# Patient Record
Sex: FEMALE | Race: WHITE | URBAN - METROPOLITAN AREA
[De-identification: names, ages, dates, MRNs, and addresses within clinical notes are randomized per-mention and may not be internally consistent; named-entity substitution may affect disease eponyms.]

---

## 2019-06-26 ENCOUNTER — NEW PATIENT (OUTPATIENT)
Dept: URBAN - METROPOLITAN AREA CLINIC 10 | Facility: CLINIC | Age: 79
End: 2019-06-26
Payer: MEDICARE

## 2019-06-26 DIAGNOSIS — H04.552 ACQUIRED STENOSIS OF LEFT NASOLACRIMAL DUCT: ICD-10-CM

## 2019-06-26 PROCEDURE — 92004 COMPRE OPH EXAM NEW PT 1/>: CPT | Performed by: OPTOMETRIST

## 2019-06-26 PROCEDURE — 92015 DETERMINE REFRACTIVE STATE: CPT | Performed by: OPTOMETRIST

## 2019-06-26 ASSESSMENT — VISUAL ACUITY
OS: 20/25
OD: 20/25

## 2019-06-26 ASSESSMENT — KERATOMETRY
OD: 44.63
OS: 45.13

## 2019-06-26 ASSESSMENT — INTRAOCULAR PRESSURE
OD: 14
OS: 14

## 2019-08-21 ENCOUNTER — CONSULT (OUTPATIENT)
Dept: URBAN - METROPOLITAN AREA CLINIC 10 | Facility: CLINIC | Age: 79
End: 2019-08-21
Payer: MEDICARE

## 2019-08-21 DIAGNOSIS — H04.332 ACUTE LACRIMAL CANALICULITIS OF LEFT LACRIMAL PASSAGE: Primary | ICD-10-CM

## 2019-08-21 PROCEDURE — 99203 OFFICE O/P NEW LOW 30 MIN: CPT | Performed by: OPHTHALMOLOGY

## 2019-08-21 PROCEDURE — 92285 EXTERNAL OCULAR PHOTOGRAPHY: CPT | Performed by: OPHTHALMOLOGY

## 2019-08-21 RX ORDER — OFLOXACIN 3 MG/ML
0.3 % SOLUTION/ DROPS OPHTHALMIC
Qty: 1 | Refills: 1 | Status: INACTIVE
Start: 2019-08-21 | End: 2021-08-24

## 2021-08-24 ENCOUNTER — OFFICE VISIT (OUTPATIENT)
Dept: URBAN - METROPOLITAN AREA CLINIC 10 | Facility: CLINIC | Age: 81
End: 2021-08-24
Payer: COMMERCIAL

## 2021-08-24 DIAGNOSIS — H25.13 AGE-RELATED NUCLEAR CATARACT, BILATERAL: Primary | ICD-10-CM

## 2021-08-24 DIAGNOSIS — R73.03 OTHER ABNORMAL GLUCOSE: ICD-10-CM

## 2021-08-24 PROCEDURE — 92134 CPTRZ OPH DX IMG PST SGM RTA: CPT | Performed by: OPTOMETRIST

## 2021-08-24 PROCEDURE — 99214 OFFICE O/P EST MOD 30 MIN: CPT | Performed by: OPTOMETRIST

## 2021-08-24 ASSESSMENT — VISUAL ACUITY
OD: 20/30
OS: 20/30

## 2021-08-24 NOTE — IMPRESSION/PLAN
Impression: Other abnormal glucose Plan: No Non-Proliferative Diabetic Retinopathy, no Diabetic Macular Edema and no Neovascularization of the iris, disc, or elsewhere. Discussed ocular and systemic benefits of blood sugar control. Send notes to PCP. Check annually.

## 2021-08-24 NOTE — IMPRESSION/PLAN
Impression: Acute lacrimal canaliculitis of left lacrimal passage Plan: Seen by oculoplastics in the past.
Pt. reports sx anticipated elsewhere due to ins.

## 2022-01-20 ENCOUNTER — OFFICE VISIT (OUTPATIENT)
Dept: URBAN - METROPOLITAN AREA CLINIC 10 | Facility: CLINIC | Age: 82
End: 2022-01-20
Payer: MEDICARE

## 2022-01-20 DIAGNOSIS — H52.223 REGULAR ASTIGMATISM, BILATERAL: ICD-10-CM

## 2022-01-20 DIAGNOSIS — H25.813 COMBINED FORMS OF AGE-RELATED CATARACT, BILATERAL: Primary | ICD-10-CM

## 2022-01-20 PROCEDURE — 92015 DETERMINE REFRACTIVE STATE: CPT | Performed by: OPTOMETRIST

## 2022-01-20 PROCEDURE — 99213 OFFICE O/P EST LOW 20 MIN: CPT | Performed by: OPTOMETRIST

## 2022-01-20 ASSESSMENT — VISUAL ACUITY
OD: 20/30
OS: 20/25

## 2022-01-20 NOTE — IMPRESSION/PLAN
Impression: Acute lacrimal canaliculitis of left lacrimal passage Plan: Seen by oculoplastics in the past.
Long discussion again. Pt. may consider consult c Dr. Nito Mcghee.

## 2022-01-20 NOTE — IMPRESSION/PLAN
Impression: Combined forms of age-related cataract, bilateral: H25.813. Plan: Discussed cataract diagnosis with the patient. Discussed and reviewed treatment options for cataracts. Risks and benefits of surgical treatment were discussed and understood. Patient elects surgical treatment. Recommend surgery OU, OD first.
--Plan near aim OD, distance aim OS. Pt. currently has monovision. --Discussed ERM may limit BCVA p sx. 
--Pt will hold off- request update MRx

## 2023-08-22 ENCOUNTER — OFFICE VISIT (OUTPATIENT)
Dept: URBAN - METROPOLITAN AREA CLINIC 10 | Facility: CLINIC | Age: 83
End: 2023-08-22
Payer: MEDICARE

## 2023-08-22 DIAGNOSIS — H53.453 OTHER LOCALIZED VISUAL FIELD DEFECT, BILATERAL: ICD-10-CM

## 2023-08-22 DIAGNOSIS — H25.813 COMBINED FORMS OF AGE-RELATED CATARACT, BILATERAL: Primary | ICD-10-CM

## 2023-08-22 DIAGNOSIS — H04.332 ACUTE LACRIMAL CANALICULITIS OF LEFT LACRIMAL PASSAGE: ICD-10-CM

## 2023-08-22 DIAGNOSIS — R73.03 OTHER ABNORMAL GLUCOSE: ICD-10-CM

## 2023-08-22 PROCEDURE — 99214 OFFICE O/P EST MOD 30 MIN: CPT | Performed by: OPTOMETRIST

## 2023-08-22 PROCEDURE — 92134 CPTRZ OPH DX IMG PST SGM RTA: CPT | Performed by: OPTOMETRIST

## 2023-08-22 ASSESSMENT — VISUAL ACUITY
OS: 20/30
OD: 20/25

## 2023-08-22 ASSESSMENT — INTRAOCULAR PRESSURE
OS: 14
OD: 14

## 2023-11-29 ENCOUNTER — OFFICE VISIT (OUTPATIENT)
Dept: URBAN - METROPOLITAN AREA CLINIC 10 | Facility: CLINIC | Age: 83
End: 2023-11-29
Payer: MEDICARE

## 2023-11-29 DIAGNOSIS — H04.332 ACUTE LACRIMAL CANALICULITIS OF LEFT LACRIMAL PASSAGE: ICD-10-CM

## 2023-11-29 DIAGNOSIS — H53.453 OTHER LOCALIZED VISUAL FIELD DEFECT, BILATERAL: Primary | ICD-10-CM

## 2023-11-29 DIAGNOSIS — H25.813 COMBINED FORMS OF AGE-RELATED CATARACT, BILATERAL: ICD-10-CM

## 2023-11-29 PROCEDURE — 99213 OFFICE O/P EST LOW 20 MIN: CPT | Performed by: OPTOMETRIST

## 2023-11-29 RX ORDER — AZITHROMYCIN DIHYDRATE 250 MG/1
250 MG TABLET, FILM COATED ORAL
Qty: 6 | Refills: 0 | Status: INACTIVE
Start: 2023-11-29 | End: 2023-12-03

## 2023-11-29 ASSESSMENT — INTRAOCULAR PRESSURE
OD: 14
OS: 15

## 2024-03-05 ENCOUNTER — OFFICE VISIT (OUTPATIENT)
Dept: URBAN - METROPOLITAN AREA CLINIC 10 | Facility: CLINIC | Age: 84
End: 2024-03-05
Payer: MEDICARE

## 2024-03-05 DIAGNOSIS — H04.332 ACUTE LACRIMAL CANALICULITIS OF LEFT LACRIMAL PASSAGE: ICD-10-CM

## 2024-03-05 DIAGNOSIS — H53.453 OTHER LOCALIZED VISUAL FIELD DEFECT, BILATERAL: ICD-10-CM

## 2024-03-05 DIAGNOSIS — H25.813 COMBINED FORMS OF AGE-RELATED CATARACT, BILATERAL: Primary | ICD-10-CM

## 2024-03-05 PROCEDURE — 99214 OFFICE O/P EST MOD 30 MIN: CPT | Performed by: OPTOMETRIST

## 2024-03-05 PROCEDURE — 92134 CPTRZ OPH DX IMG PST SGM RTA: CPT | Performed by: OPTOMETRIST

## 2024-03-05 ASSESSMENT — INTRAOCULAR PRESSURE
OD: 19
OS: 18

## 2024-03-05 ASSESSMENT — VISUAL ACUITY
OD: 20/30
OS: 20/25

## 2024-07-03 ENCOUNTER — OFFICE VISIT (OUTPATIENT)
Dept: URBAN - METROPOLITAN AREA CLINIC 10 | Facility: CLINIC | Age: 84
End: 2024-07-03
Payer: MEDICARE

## 2024-07-03 DIAGNOSIS — H04.332 ACUTE LACRIMAL CANALICULITIS OF LEFT LACRIMAL PASSAGE: Primary | ICD-10-CM

## 2024-07-03 PROCEDURE — 99203 OFFICE O/P NEW LOW 30 MIN: CPT | Performed by: OPHTHALMOLOGY
